# Patient Record
Sex: FEMALE
[De-identification: names, ages, dates, MRNs, and addresses within clinical notes are randomized per-mention and may not be internally consistent; named-entity substitution may affect disease eponyms.]

---

## 2017-04-19 ENCOUNTER — HOSPITAL ENCOUNTER (EMERGENCY)
Dept: HOSPITAL 14 - H.ER | Age: 27
Discharge: HOME | End: 2017-04-19
Payer: COMMERCIAL

## 2017-04-19 VITALS
OXYGEN SATURATION: 100 % | HEART RATE: 74 BPM | RESPIRATION RATE: 16 BRPM | DIASTOLIC BLOOD PRESSURE: 85 MMHG | SYSTOLIC BLOOD PRESSURE: 141 MMHG | TEMPERATURE: 98.4 F

## 2017-04-19 DIAGNOSIS — H10.9: Primary | ICD-10-CM

## 2017-04-19 NOTE — ED PDOC
HPI: Eye Injury/Pain


Time Seen by Provider: 04/19/17 17:53


Chief Complaint (Nursing): Eye Problem


Chief Complaint (Provider): bilateral eye pain and discharge


History Per: Patient


History/Exam Limitations: no limitations


Onset/Duration Of Symptoms: Days (x 4)


Current Symptoms Are (Timing): Still Present


Injury To Eye?: No


Wears Contact Lens?: No


Associated Symptoms: Pain, Itching, Discharge From Eye.  denies: Decreased 

Vision, Swelling, FB Sensation


Additional Complaint(s): 


Alivia Alexandre is a 26 year old female, with no previous medical history, who 

presents to the ED with complaints of bilateral eye pain associated with thick 

yellow discharge. Pt denies any decrease in vision, contact use or foreign body 

sensations. Denies any trauma.  No fever or chills.  





PMD: none provided 





Past Medical History


Reviewed: Historical Data, Nursing Documentation, Vital Signs


Vital Signs: 


 Last Vital Signs











Temp  98.4 F   04/19/17 17:47


 


Pulse  74   04/19/17 17:47


 


Resp  16   04/19/17 17:47


 


BP  141/85   04/19/17 17:47


 


Pulse Ox  100   04/19/17 17:47














- Medical History


PMH: No Chronic Diseases





- Surgical History


Surgical History: No Surg Hx





- Family History


Family History: States: No Known Family Hx





- Living Arrangements


Living Arrangements: With Family





- Social History


Current smoker - smoking cessation education provided: No


Alcohol: None


Drugs: Denies





- Home Medications


Home Medications: 


 Ambulatory Orders











 Medication  Instructions  Recorded


 


Cimetidine 300 mg PO DAILY #5 tab 03/14/15


 


DiphenhydrAMINE [Benadryl] 25 mg PO Q8H PRN 5 Days 03/14/15


 


Prednisone 20 mg PO BID #10 tab 03/14/15


 


Amoxicillin 875 mg PO BID #20 tab 08/06/16


 


Fexofenadine/Pseudoephedrine 1 each PO BID #10 tab.er.12h 08/06/16





[Allegra-D 12 Hour Tablet]  


 


Acyclovir [Zovirax] 400 mg PO TID 10 Days 09/19/16


 


Ibuprofen [Motrin] 600 mg PO Q6 #20 tab 09/19/16


 


oxyCODONE/Acetaminophen [Percocet 1 ea PO Q6 PRN #5 tab 09/19/16





5/325 mg Tab]  


 


Tobramycin [Tobrex] 5 ml TOP QID #1 bottle 04/19/17














- Allergies


Allergies/Adverse Reactions: 


 Allergies











Allergy/AdvReac Type Severity Reaction Status Date / Time


 


apples Allergy  SHORTNESS Uncoded 04/19/17 17:47





   OF BREATH  


 


carrots Allergy  RASH Uncoded 04/19/17 17:47














Review of Systems


ROS Statement: Except As Marked, All Systems Reviewed And Found Negative


Eyes: Positive for: Pain, Redness, Other (discharge from both eyes).  Negative 

for: Vision Change, Conjunctivae Inflammation, Eyelid Inflammation


Neurological: Negative for: Headache, Dizziness





Physical Exam





- Reviewed


Nursing Documentation Reviewed: Yes


Vital Signs Reviewed: Yes





- Physical Exam


Appears: Positive for: Well, Non-toxic, No Acute Distress


Head Exam: Positive for: ATRAUMATIC, NORMAL INSPECTION


Skin: Negative for: Rash


Eye Exam: Positive for: EOMI, PERRL, Other (Bilateral conjunctival injection, 

scant yellow discharge noted to medial aspect of bilateral eyes, no periorbital 

swelling or tenderness, no gross foreign bodies).  Negative for: Periorbital 

swelling, Periorbital tenderness


ENT: Positive for: Normal ENT Inspection


Neurologic/Psych: Positive for: Alert, Oriented





- ECG


O2 Sat by Pulse Oximetry: 100 (RA)


Pulse Ox Interpretation: Normal





Medical Decision Making


Medical Decision Making: 


Impression: Bilateral purulent conjunctivitis.





Prescription given for tobramycin ophthalmic. Patient was advised warm 

compresses to both eyes. She was advised wash hands frequently to avoid spread 

of infection. Patient was referred to ophthalmologist on call and clinic for 

follow-up.





Disposition





- Clinical Impression


Clinical Impression: 


 Conjunctivitis





- Patient ED Disposition


Is Patient to be Admitted: No


Counseled Patient/Family Regarding: Diagnosis, Need For Followup, Rx Given





- Disposition


Referrals: 


Oh Rasmussen MD [Staff Provider] - 


[facility] [Provider Group]


Disposition: Routine/Home


Disposition Time: 18:25


Condition: STABLE


Additional Instructions: 


Apply drops as directed.  Follow up in 2-3 days with ophthalmologist or with 

clinic. 


Prescriptions: 


Tobramycin [Tobrex] 5 ml TOP QID #1 bottle


Instructions:  Conjunctivitis (ED)

## 2017-12-22 ENCOUNTER — HOSPITAL ENCOUNTER (EMERGENCY)
Dept: HOSPITAL 14 - H.ER | Age: 27
Discharge: HOME | End: 2017-12-22
Payer: COMMERCIAL

## 2017-12-22 VITALS
RESPIRATION RATE: 16 BRPM | HEART RATE: 72 BPM | TEMPERATURE: 98 F | SYSTOLIC BLOOD PRESSURE: 131 MMHG | DIASTOLIC BLOOD PRESSURE: 73 MMHG | OXYGEN SATURATION: 99 %

## 2017-12-22 DIAGNOSIS — B00.9: Primary | ICD-10-CM

## 2017-12-22 NOTE — ED PDOC
HPI: Female  Pain


Time Seen by Provider: 12/22/17 17:45


Chief Complaint (Nursing): Female Genitourinary


Chief Complaint (Provider): genital lesions


History Per: Patient


History/Exam Limitations: no limitations


Additional Complaint(s): 





27yo f in Ed with hx of genital herpes -states that she has been noting 

prodromal symptoms x 2days ago ie tingling to vaginal area and one or two 

lesions around vulva. no fever chills nausea or vomiting no hematuria or 

dysuraia. 





Past Medical History


Reviewed: Historical Data, Nursing Documentation, Vital Signs


Vital Signs: 





 Last Vital Signs











Temp  98.0 F   12/22/17 17:40


 


Pulse  72   12/22/17 17:40


 


Resp  16   12/22/17 17:40


 


BP  131/73   12/22/17 17:40


 


Pulse Ox  99   12/22/17 17:40














- Medical History


PMH: Sexually Transmitted Disease (genital herpes)





- Family History


Family History: States: Unknown Family Hx





- Home Medications


Home Medications: 


 Ambulatory Orders











 Medication  Instructions  Recorded


 


Cimetidine 300 mg PO DAILY #5 tab 03/14/15


 


DiphenhydrAMINE [Benadryl] 25 mg PO Q8H PRN 5 Days  cap 03/14/15


 


Prednisone 20 mg PO BID #10 tab 03/14/15


 


Amoxicillin 875 mg PO BID #20 tab 08/06/16


 


Fexofenadine/Pseudoephedrine 1 each PO BID #10 tab.er.12h 08/06/16





[Allegra-D 12 Hour Tablet]  


 


Acyclovir [Zovirax] 400 mg PO TID 10 Days  tablet 09/19/16


 


Ibuprofen [Motrin] 600 mg PO Q6 #20 tab 09/19/16


 


oxyCODONE/Acetaminophen [Percocet 1 ea PO Q6 PRN #5 tab 09/19/16





5/325 mg Tab]  


 


Tobramycin [Tobrex] 5 ml TOP QID #1 bottle 04/19/17


 


Acyclovir [Zovirax] 400 mg PO TID #30 tab 12/22/17














- Allergies


Allergies/Adverse Reactions: 


 Allergies











Allergy/AdvReac Type Severity Reaction Status Date / Time


 


apples Allergy  SHORTNESS Uncoded 04/19/17 17:47





   OF BREATH  


 


carrots Allergy  RASH Uncoded 04/19/17 17:47














Review of Systems


ROS Statement: Except As Marked, All Systems Reviewed And Found Negative


Constitutional: Negative for: Fever, Chills


Genitourinary Female: Negative for: Dysuria, Frequency, Hematuria, Vaginal 

Discharge, Vaginal Bleeding





Physical Exam





- Reviewed


Nursing Documentation Reviewed: Yes


Vital Signs Reviewed: Yes





- Physical Exam


Appears: Positive for: Well, Non-toxic, No Acute Distress


Skin: Positive for: Normal Color, Warm, DRY


Pelvic Exam: Positive for: Other (opts to not have pelvic exam)


Neurologic/Psych: Positive for: Alert, Oriented





- ECG


O2 Sat by Pulse Oximetry: 99





Medical Decision Making


Medical Decision Making: 





pt will be treated with acyclovir and advised to have pmd f.u 








 











Temp Pulse Resp BP Pulse Ox


 


 98.0 F   72   16   131/73   99 


 


 12/22/17 17:40  12/22/17 17:40  12/22/17 17:40  12/22/17 17:40  12/22/17 18:01














Disposition





- Clinical Impression


Clinical Impression: 


 Herpes








- Patient ED Disposition


Is Patient to be Admitted: No


Counseled Patient/Family Regarding: Diagnosis, Need For Followup, Rx Given





- Disposition


Disposition: Routine/Home


Disposition Time: 18:03


Condition: STABLE


Prescriptions: 


Acyclovir [Zovirax] 400 mg PO TID #30 tab


Instructions:  Genital Herpes Simplex (ED)


Forms:  [facility] (English)

## 2017-12-30 ENCOUNTER — HOSPITAL ENCOUNTER (EMERGENCY)
Dept: HOSPITAL 14 - H.ER | Age: 27
Discharge: HOME | End: 2017-12-30
Payer: MEDICAID

## 2017-12-30 VITALS
DIASTOLIC BLOOD PRESSURE: 78 MMHG | HEART RATE: 62 BPM | OXYGEN SATURATION: 99 % | TEMPERATURE: 98.2 F | SYSTOLIC BLOOD PRESSURE: 139 MMHG | RESPIRATION RATE: 18 BRPM

## 2017-12-30 DIAGNOSIS — L05.91: Primary | ICD-10-CM

## 2017-12-30 NOTE — ED PDOC
HPI: Back


Time Seen by Provider: 12/30/17 20:36


Chief Complaint (Nursing): Back Pain


Chief Complaint (Provider): Back Pain


History Per: Patient


History/Exam Limitations: no limitations


Additional Complaint(s): 





27 year old female presents to the emergency department with a complaint of a 

lower back pain. Patient has a history of a pilonidal cyst which she had 

drained once but then removed in 2011. Reports she usually gets lower back pain 

and uses heating pads to relieve the pain. Denies fever or chills. 





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 

states that she cannot go to work tonight because of her lower back pain. 





Past Medical History


Reviewed: Historical Data, Nursing Documentation, Vital Signs


Vital Signs: 


 Last Vital Signs











Temp  98.2 F   12/30/17 20:01


 


Pulse  62   12/30/17 20:01


 


Resp  18   12/30/17 20:01


 


BP  139/78   12/30/17 20:01


 


Pulse Ox  99   12/30/17 20:01














- Medical History


PMH: Sexually Transmitted Disease (genital herpes)


   Denies: Chronic Kidney Disease





- Surgical History


Surgical History: No Surg Hx





- Family History


Family History: States: Unknown Family Hx





- Social History


Current smoker - smoking cessation education provided: No


Alcohol: None


Drugs: Denies





- Home Medications


Home Medications: 


 Ambulatory Orders











 Medication  Instructions  Recorded


 


Cimetidine 300 mg PO DAILY #5 tab 03/14/15


 


DiphenhydrAMINE [Benadryl] 25 mg PO Q8H PRN 5 Days  cap 03/14/15


 


Prednisone 20 mg PO BID #10 tab 03/14/15


 


Amoxicillin 875 mg PO BID #20 tab 08/06/16


 


Fexofenadine/Pseudoephedrine 1 each PO BID #10 tab.er.12h 08/06/16





[Allegra-D 12 Hour Tablet]  


 


Acyclovir [Zovirax] 400 mg PO TID 10 Days  tablet 09/19/16


 


Ibuprofen [Motrin] 600 mg PO Q6 #20 tab 09/19/16


 


oxyCODONE/Acetaminophen [Percocet 1 ea PO Q6 PRN #5 tab 09/19/16





5/325 mg Tab]  


 


Tobramycin [Tobrex] 5 ml TOP QID #1 bottle 04/19/17


 


Acyclovir [Zovirax] 400 mg PO TID #30 tab 12/22/17














- Allergies


Allergies/Adverse Reactions: 


 Allergies











Allergy/AdvReac Type Severity Reaction Status Date / Time


 


apples Allergy  SHORTNESS Uncoded 04/19/17 17:47





   OF BREATH  


 


carrots Allergy  RASH Uncoded 04/19/17 17:47














Review of Systems


ROS Statement: Except As Marked, All Systems Reviewed And Found Negative (As 

per HPI, otherwise negative)


Constitutional: Negative for: Fever, Chills


Musculoskeletal: Positive for: Back Pain (lower)





Physical Exam





- Reviewed


Nursing Documentation Reviewed: Yes


Vital Signs Reviewed: Yes





- Physical Exam


Appears: Positive for: Non-toxic, No Acute Distress


Head Exam: Positive for: ATRAUMATIC, NORMAL INSPECTION, NORMOCEPHALIC


Skin: Positive for: Normal Color, Warm, Dry


Back: Positive for: Normal Inspection.  Negative for: Vertebral Tenderness


Neurologic/Psych: Positive for: Alert, Oriented (x3)





- ECG


O2 Sat by Pulse Oximetry: 99 (RA)


Pulse Ox Interpretation: Normal





Medical Decision Making


Medical Decision Making: 





Time: 2040


Initial impression: Lower back Pain


Initial plan:


-Evaluate





Time: 2059


--Patient was advised to use Tylenol as needed for pain. 


--Discharged home.





Clinical Impression: Back pain  





Scribe Attestation:


Documented by Joanna Gore, acting as a scribe for Cely Hoyt PA-C





Provider Scribe Attestation:


All medical record entries made by the Scribe were at my direction and 

personally dictated by me. I have reviewed the chart and agree that the record 

accurately reflects my personal performance of the history, physical exam, 

medical decision making, and the department course for this patient. I have 

also personally directed, reviewed, and agree with the discharge instructions 

and disposition.





Disposition





- Clinical Impression


Clinical Impression: 


 Back pain





Counseled Patient/Family Regarding: Diagnosis





- Disposition


Disposition: Routine/Home


Disposition Time: 20:59


Condition: STABLE


Additional Instructions: 


Tylenol for pain. 


Instructions:  Back Pain (ED)


Forms:  [facility] (English), [facility] ED School/Work Excuse

## 2018-04-17 ENCOUNTER — HOSPITAL ENCOUNTER (EMERGENCY)
Dept: HOSPITAL 14 - H.ER | Age: 28
LOS: 1 days | Discharge: HOME | End: 2018-04-18
Payer: MEDICAID

## 2018-04-17 VITALS — OXYGEN SATURATION: 98 %

## 2018-04-17 DIAGNOSIS — M25.551: Primary | ICD-10-CM

## 2018-04-17 PROCEDURE — 99283 EMERGENCY DEPT VISIT LOW MDM: CPT

## 2018-04-17 PROCEDURE — 73502 X-RAY EXAM HIP UNI 2-3 VIEWS: CPT

## 2018-04-17 PROCEDURE — 96372 THER/PROPH/DIAG INJ SC/IM: CPT

## 2018-04-18 VITALS
DIASTOLIC BLOOD PRESSURE: 72 MMHG | RESPIRATION RATE: 16 BRPM | HEART RATE: 86 BPM | TEMPERATURE: 98.6 F | SYSTOLIC BLOOD PRESSURE: 137 MMHG

## 2018-04-18 NOTE — ED PDOC
Lower Extremity Pain/Injury


Time Seen by Provider: 04/18/18 00:45


Chief Complaint (Nursing): Back Pain


Chief Complaint (Provider): right hip pain


History Per: Patient


History/Exam Limitations: no limitations


Onset/Duration Of Symptoms: Days (1)


Current Symptoms Are (Timing): Still Present


Additional Complaint(s): 





28 y/o female presents with right hip pain x 2 days.  Associated radiation of 

pain down right thigh. Patient states she dances 4-6 times per week and thinks 

at may be related. Pain worse when ambulating and walking down steps.  Denies 

swelling/deformity, limitation of movement. 





Past Medical History


Reviewed: Historical Data, Nursing Documentation, Vital Signs


Vital Signs: 





 Last Vital Signs











Temp  97.4 F L  04/17/18 23:58


 


Pulse  78   04/17/18 23:58


 


Resp  18   04/17/18 23:58


 


BP  137/78   04/17/18 23:58


 


Pulse Ox  98   04/17/18 23:58














- Medical History


PMH: Sexually Transmitted Disease (genital herpes)


   Denies: Chronic Kidney Disease





- Surgical History


Surgical History: No Surg Hx





- Family History


Family History: States: Unknown Family Hx





- Home Medications


Home Medications: 


 Ambulatory Orders











 Medication  Instructions  Recorded


 


Cimetidine 300 mg PO DAILY #5 tab 03/14/15


 


DiphenhydrAMINE [Benadryl] 25 mg PO Q8H PRN 5 Days  cap 03/14/15


 


Prednisone 20 mg PO BID #10 tab 03/14/15


 


Amoxicillin 875 mg PO BID #20 tab 08/06/16


 


Fexofenadine/Pseudoephedrine 1 each PO BID #10 tab.er.12h 08/06/16





[Allegra-D 12 Hour Tablet]  


 


Acyclovir [Zovirax] 400 mg PO TID 10 Days  tablet 09/19/16


 


Ibuprofen [Motrin] 600 mg PO Q6 #20 tab 09/19/16


 


oxyCODONE/Acetaminophen [Percocet 1 ea PO Q6 PRN #5 tab 09/19/16





5/325 mg Tab]  


 


Tobramycin [Tobrex] 5 ml TOP QID #1 bottle 04/19/17


 


Acyclovir [Zovirax] 400 mg PO TID #30 tab 12/22/17


 


Cyclobenzaprine [Cyclobenzaprine 10 mg PO BID PRN #14 tab 04/18/18





HCl]  














- Allergies


Allergies/Adverse Reactions: 


 Allergies











Allergy/AdvReac Type Severity Reaction Status Date / Time


 


apples Allergy  SHORTNESS Uncoded 04/19/17 17:47





   OF BREATH  


 


carrots Allergy  RASH Uncoded 04/19/17 17:47














Review of Systems


ROS Statement: Except As Marked, All Systems Reviewed And Found Negative


Musculoskeletal: Positive for: Leg Pain (right hip)





Physical Exam





- Reviewed


Nursing Documentation Reviewed: Yes


Vital Signs Reviewed: Yes





- Physical Exam


Appears: Positive for: Well, Non-toxic, No Acute Distress


Head Exam: Positive for: ATRAUMATIC, NORMAL INSPECTION, NORMOCEPHALIC


Skin: Positive for: Normal Color


Cardiovascular/Chest: Positive for: Regular Rate, Rhythm


Respiratory: Positive for: Normal Breath Sounds


Extremity: Positive for: Normal ROM, Tenderness (pain with flexion right hip)


Neurologic/Psych: Positive for: Alert, Oriented





- ECG


O2 Sat by Pulse Oximetry: 98





- Other Rad


  ** xray right hip


X-Ray: Viewed By Me


X-Ray Interpretation: no acute findings





- Progress


ED Course And Treament: 





xray, toradol IM





On re-eval, patient states pain improved.


Patient educated on findings, discharged with rx flexeril. Advised Tylenol Q4-6 

PRN.


Follow up PMD 2-3 days.


Return precautions given





Disposition





- Clinical Impression


Clinical Impression: 


 Right hip pain








- Patient ED Disposition


Is Patient to be Admitted: No


Counseled Patient/Family Regarding: Studies Performed, Diagnosis, Need For 

Followup, Rx Given





- Disposition


Disposition: Routine/Home


Disposition Time: 02:38


Condition: IMPROVED


Prescriptions: 


Cyclobenzaprine [Cyclobenzaprine HCl] 10 mg PO BID PRN #14 tab


 PRN Reason: Muscle Spasm


Instructions:  Hip Pain


Forms:  [facility] (English), [facility] ED School/Work Excuse

## 2018-04-18 NOTE — RAD
PROCEDURE:  Right Hip Radiographs.



HISTORY:

pain  



COMPARISON:

None.



FINDINGS:



BONES:

No acute fracture. Bone island in the left inferior pubic ramus. 



JOINTS:

Normal. 



SOFT TISSUES:

Normal. 



OTHER FINDINGS:

None.



IMPRESSION:

No demonstrated fracture or dislocation.

## 2019-01-03 ENCOUNTER — HOSPITAL ENCOUNTER (EMERGENCY)
Dept: HOSPITAL 14 - H.ER | Age: 29
Discharge: HOME | End: 2019-01-03
Payer: MEDICAID

## 2019-01-03 VITALS
SYSTOLIC BLOOD PRESSURE: 122 MMHG | DIASTOLIC BLOOD PRESSURE: 77 MMHG | TEMPERATURE: 98 F | OXYGEN SATURATION: 99 % | RESPIRATION RATE: 18 BRPM | HEART RATE: 78 BPM

## 2019-01-03 DIAGNOSIS — Z33.1: Primary | ICD-10-CM

## 2019-01-03 NOTE — ED PDOC
HPI: General Adult


Time Seen by Provider: 01/03/19 15:50


Chief Complaint (Nursing): Medical Clearance


Chief Complaint (Provider): pregnancy


History Per: Patient (29 y/o female  [facility] she did

not have insurance to cover her desired termination of pregnany.  Patient denies

any abd pain/vaginal bleeding.  States she had vaginal bleeding 3 days nov 2018 

abnl.  Denies any vomiting.  Unable to obtain gyn appontment as of yet.)





Past Medical History


Reviewed: Historical Data, Nursing Documentation, Vital Signs


Vital Signs: 





                                Last Vital Signs











Temp  98.4 F   01/03/19 14:28


 


Pulse  94 H  01/03/19 14:28


 


Resp  16   01/03/19 14:28


 


BP  154/83 H  01/03/19 14:28


 


Pulse Ox  100   01/03/19 14:28














- Medical History


PMH: Sexually Transmitted Disease (genital herpes)


   Denies: Chronic Kidney Disease





- Family History


Family History: States: Unknown Family Hx





- Home Medications


Home Medications: 


                                Ambulatory Orders











 Medication  Instructions  Recorded


 


Cimetidine 300 mg PO DAILY #5 tab 03/14/15


 


DiphenhydrAMINE [Benadryl] 25 mg PO Q8H PRN 5 Days  cap 03/14/15


 


Prednisone 20 mg PO BID #10 tab 03/14/15


 


Amoxicillin 875 mg PO BID #20 tab 08/06/16


 


Fexofenadine/Pseudoephedrine 1 each PO BID #10 tab.er.12h 08/06/16





[Allegra-D 12 Hour Tablet]  


 


Acyclovir [Zovirax] 400 mg PO TID 10 Days  tablet 09/19/16


 


Ibuprofen [Motrin] 600 mg PO Q6 #20 tab 09/19/16


 


oxyCODONE/Acetaminophen [Percocet 1 ea PO Q6 PRN #5 tab 09/19/16





5/325 mg Tab]  


 


Tobramycin [Tobrex] 5 ml TOP QID #1 bottle 04/19/17


 


Acyclovir [Zovirax] 400 mg PO TID #30 tab 12/22/17


 


Cyclobenzaprine [Cyclobenzaprine 10 mg PO BID PRN #14 tab 04/18/18





HCl]  


 


Prenatal Multivit/Folic Acid/I 1 tab PO DAILY #30 tab 01/03/19





[Prenatal Plus]  














- Allergies


Allergies/Adverse Reactions: 


                                    Allergies











Allergy/AdvReac Type Severity Reaction Status Date / Time


 


vicki Allergy  SHORTNESS Uncoded 01/03/19 14:27





   OF BREATH  


 


carrots Allergy  RASH Uncoded 01/03/19 14:27














Review of Systems


ROS Statement: Except As Marked, All Systems Reviewed And Found Negative





Physical Exam





- Reviewed


Nursing Documentation Reviewed: Yes


Vital Signs Reviewed: Yes





- Physical Exam


Appears: Positive for: Well, Non-toxic, No Acute Distress


Head Exam: Positive for: ATRAUMATIC, NORMAL INSPECTION, NORMOCEPHALIC


Skin: Positive for: Normal Color, Warm, DRY


Eye Exam: Positive for: EOMI, Normal appearance, PERRL


ENT: Positive for: Normal ENT Inspection


Neck: Positive for: Normal, Painless ROM


Cardiovascular/Chest: Positive for: Regular Rate, Rhythm


Respiratory: Positive for: CNT, Normal Breath Sounds


Gastrointestinal/Abdominal: Positive for: Normal Exam, Soft


Back: Positive for: Normal Inspection


Extremity: Positive for: Normal ROM


Neurologic/Psych: Positive for: Alert, Oriented





- ECG


O2 Sat by Pulse Oximetry: 100





- Progress


ED Course And Treament: 





beta hcg bloodwork given to patient to aid in f[facility]u with [facility].





Disposition





- Clinical Impression


Clinical Impression: 


 Pregnancy








- Patient ED Disposition


Is Patient to be Admitted: No





- Disposition


Referrals: 


[facility] [Outside]


Disposition: Routine/Home


Disposition Time: 17:35


Condition: FAIR


Prescriptions: 


Prenatal Multivit/Folic Acid/I [Prenatal Plus] 1 tab PO DAILY #30 tab


Instructions:  General (DC)